# Patient Record
Sex: FEMALE | Race: BLACK OR AFRICAN AMERICAN | Employment: UNEMPLOYED | URBAN - METROPOLITAN AREA
[De-identification: names, ages, dates, MRNs, and addresses within clinical notes are randomized per-mention and may not be internally consistent; named-entity substitution may affect disease eponyms.]

---

## 2016-07-27 LAB — MICROALBUMIN UR TEST STR-MCNC: 100 MG/DL

## 2017-05-16 ENCOUNTER — ED HISTORICAL/CONVERTED ENCOUNTER (OUTPATIENT)
Dept: OTHER | Age: 52
End: 2017-05-16

## 2020-02-05 LAB
CREATININE, EXTERNAL: 3.02
LDL-C, EXTERNAL: 115

## 2020-02-18 ENCOUNTER — OP HISTORICAL/CONVERTED ENCOUNTER (OUTPATIENT)
Dept: OTHER | Age: 55
End: 2020-02-18

## 2020-02-18 LAB — MAMMOGRAPHY, EXTERNAL: NORMAL

## 2020-08-31 VITALS
HEART RATE: 82 BPM | OXYGEN SATURATION: 98 % | TEMPERATURE: 97.9 F | DIASTOLIC BLOOD PRESSURE: 94 MMHG | SYSTOLIC BLOOD PRESSURE: 183 MMHG | RESPIRATION RATE: 16 BRPM | WEIGHT: 175 LBS | BODY MASS INDEX: 29.88 KG/M2 | HEIGHT: 64 IN

## 2020-08-31 PROBLEM — N18.30 STAGE 3 CHRONIC KIDNEY DISEASE (HCC): Status: ACTIVE | Noted: 2020-08-31

## 2020-08-31 PROBLEM — I10 ESSENTIAL HYPERTENSION: Status: ACTIVE | Noted: 2020-08-31

## 2020-08-31 PROBLEM — R06.83 SNORING: Status: ACTIVE | Noted: 2020-08-31

## 2020-08-31 RX ORDER — LISINOPRIL 10 MG/1
TABLET ORAL DAILY
COMMUNITY
End: 2020-12-03 | Stop reason: ALTCHOICE

## 2020-08-31 RX ORDER — LOSARTAN POTASSIUM 50 MG/1
TABLET ORAL DAILY
COMMUNITY
End: 2020-12-03 | Stop reason: SDUPTHER

## 2020-10-02 ENCOUNTER — TELEPHONE (OUTPATIENT)
Dept: FAMILY MEDICINE CLINIC | Age: 55
End: 2020-10-02

## 2020-10-02 NOTE — TELEPHONE ENCOUNTER
Patient called and LVM stating she needs a refill on her Losartan 50 mg. Patient has [address] and keeps getting cancelled by new providers. She has ran out of her BP medication.      Patient advised to contact pharmacy and ask for them to send a refill request.

## 2020-11-02 RX ORDER — LOSARTAN POTASSIUM 50 MG/1
50 TABLET ORAL DAILY
Qty: 90 TAB | Refills: 1 | Status: SHIPPED | OUTPATIENT
Start: 2020-11-02 | End: 2020-12-03 | Stop reason: DRUGHIGH

## 2020-12-03 ENCOUNTER — OFFICE VISIT (OUTPATIENT)
Dept: INTERNAL MEDICINE CLINIC | Age: 55
End: 2020-12-03
Payer: COMMERCIAL

## 2020-12-03 VITALS
SYSTOLIC BLOOD PRESSURE: 180 MMHG | TEMPERATURE: 97.5 F | BODY MASS INDEX: 28.58 KG/M2 | DIASTOLIC BLOOD PRESSURE: 90 MMHG | OXYGEN SATURATION: 98 % | HEIGHT: 64 IN | WEIGHT: 167.4 LBS | HEART RATE: 77 BPM

## 2020-12-03 DIAGNOSIS — L40.0 PLAQUE PSORIASIS: ICD-10-CM

## 2020-12-03 DIAGNOSIS — Z23 NEEDS FLU SHOT: ICD-10-CM

## 2020-12-03 DIAGNOSIS — N18.5 CKD STAGE 5 SECONDARY TO HYPERTENSION (HCC): ICD-10-CM

## 2020-12-03 DIAGNOSIS — N18.4 CKD STAGE 4 SECONDARY TO HYPERTENSION (HCC): ICD-10-CM

## 2020-12-03 DIAGNOSIS — I10 ESSENTIAL HYPERTENSION: Primary | ICD-10-CM

## 2020-12-03 DIAGNOSIS — E78.2 MIXED HYPERLIPIDEMIA: ICD-10-CM

## 2020-12-03 DIAGNOSIS — E87.20 METABOLIC ACIDOSIS: ICD-10-CM

## 2020-12-03 DIAGNOSIS — R80.9 PROTEINURIA, UNSPECIFIED TYPE: ICD-10-CM

## 2020-12-03 DIAGNOSIS — E87.5 HYPERKALEMIA: ICD-10-CM

## 2020-12-03 DIAGNOSIS — I12.9 CKD STAGE 4 SECONDARY TO HYPERTENSION (HCC): ICD-10-CM

## 2020-12-03 DIAGNOSIS — I12.0 CKD STAGE 5 SECONDARY TO HYPERTENSION (HCC): ICD-10-CM

## 2020-12-03 PROCEDURE — 99203 OFFICE O/P NEW LOW 30 MIN: CPT | Performed by: INTERNAL MEDICINE

## 2020-12-03 PROCEDURE — 90756 CCIIV4 VACC ABX FREE IM: CPT | Performed by: INTERNAL MEDICINE

## 2020-12-03 RX ORDER — CLOBETASOL PROPIONATE 0.5 MG/G
OINTMENT TOPICAL 2 TIMES DAILY
Qty: 15 G | Refills: 0 | Status: SHIPPED | OUTPATIENT
Start: 2020-12-03 | End: 2021-04-06 | Stop reason: SDUPTHER

## 2020-12-03 RX ORDER — LOSARTAN POTASSIUM 100 MG/1
100 TABLET ORAL DAILY
Qty: 30 TAB | Refills: 2 | Status: SHIPPED | OUTPATIENT
Start: 2020-12-03 | End: 2021-01-06 | Stop reason: ALTCHOICE

## 2020-12-03 NOTE — PROGRESS NOTES
Muna Ann is a 50 y.o. female and presents with New Patient and Hypertension    HTN: Her bp today iselevated she says at home as well, lowest systolic 887   She has SANJU diagnosed, had cancelled the appointment to get fitted for cpap mask. She has daytime sleepiness, fatigue, frequent nighttime awakenings. Smoking: She wants to quit, she tried wellbutrin but she says it doesn't work. Pap a year ago wnl      Review of Systems  Review of Systems   Constitutional: Negative for chills, fatigue, fever and unexpected weight change. HENT: Negative for congestion, ear pain, sneezing and sore throat. Eyes: Negative for pain and discharge. Respiratory: Negative for cough, shortness of breath and wheezing. Cardiovascular: Negative for chest pain, palpitations and leg swelling. Gastrointestinal: Negative for abdominal pain, blood in stool, constipation and diarrhea. Endocrine: Negative for polydipsia and polyuria. Genitourinary: Negative for difficulty urinating, dysuria, frequency, hematuria and urgency. Musculoskeletal: Negative for arthralgias, back pain and joint swelling. Skin: Negative for rash. Allergic/Immunologic: Negative for environmental allergies and food allergies. Neurological: Negative for dizziness, speech difficulty, weakness, light-headedness, numbness and headaches. Hematological: Negative for adenopathy. Psychiatric/Behavioral: Negative for behavioral problems (Depression), sleep disturbance and suicidal ideas. Past Medical History:   Diagnosis Date    Chronic neck and back pain     Contact dermatitis and eczema due to cause     Dizziness     Hypertension     Sleep apnea      History reviewed. No pertinent surgical history.   Social History     Socioeconomic History    Marital status: SINGLE     Spouse name: Not on file    Number of children: Not on file    Years of education: Not on file    Highest education level: Not on file   Tobacco Use    Smoking status: Current Every Day Smoker     Packs/day: 1.00     Years: 33.00     Pack years: 33.00    Smokeless tobacco: Never Used   Substance and Sexual Activity    Alcohol use: Yes     Comment: occassional     Drug use: Never     Family History   Problem Relation Age of Onset    Hypertension Mother     Hypertension Sister     Heart Disease Sister     Cancer Sister         lung    Kidney Disease Sister      Current Outpatient Medications   Medication Sig Dispense Refill    losartan (COZAAR) 100 mg tablet Take 1 Tab by mouth daily for 90 days. Indications: high blood pressure 30 Tab 2    clobetasoL (TEMOVATE) 0.05 % ointment Apply  to affected area two (2) times a day. 15 g 0     No Known Allergies    Objective:  Visit Vitals  BP (!) 180/90 (BP 1 Location: Left arm, BP Patient Position: Sitting)   Pulse 77   Temp 97.5 °F (36.4 °C) (Oral)   Ht 5' 4\" (1.626 m)   Wt 167 lb 6.4 oz (75.9 kg)   SpO2 98% Comment: RA   BMI 28.73 kg/m²     Physical Exam:   Physical Exam  Constitutional:       General: She is not in acute distress. Appearance: Normal appearance. HENT:      Head: Normocephalic and atraumatic. Mouth/Throat:      Mouth: Mucous membranes are moist.   Eyes:      Extraocular Movements: Extraocular movements intact. Conjunctiva/sclera: Conjunctivae normal.      Pupils: Pupils are equal, round, and reactive to light. Neck:      Musculoskeletal: Normal range of motion and neck supple. Cardiovascular:      Rate and Rhythm: Normal rate and regular rhythm. Pulses: Normal pulses. Heart sounds: Normal heart sounds. Pulmonary:      Effort: Pulmonary effort is normal.      Breath sounds: Normal breath sounds. Abdominal:      General: Abdomen is flat. Bowel sounds are normal. There is no distension. Palpations: Abdomen is soft. There is no mass. Tenderness: There is no abdominal tenderness. Musculoskeletal:         General: No swelling or deformity.       Right lower leg: No edema. Left lower leg: No edema. Lymphadenopathy:      Cervical: No cervical adenopathy. Skin:     General: Skin is warm and dry. Capillary Refill: Capillary refill takes less than 2 seconds. Coloration: Skin is not jaundiced or pale. Findings: No erythema or rash (patchy oval well delimited raised and rough lesion hyperipgmented and with silver scalling on top of around 12 cms long and 4 cm wide in the anterior left shin mid third ). Neurological:      General: No focal deficit present. Mental Status: She is alert and oriented to person, place, and time. Psychiatric:         Mood and Affect: Mood normal.         Behavior: Behavior normal.         Thought Content: Thought content normal.         Judgment: Judgment normal.          Results for orders placed or performed in visit on 08/31/20    MAMMOGRAPHY   Result Value Ref Range    Mammography, External       IMPRESSION:  Possible 4 mm oval mass at 1:00 middle depth right breast seen only   AMB EXT LDL-C   Result Value Ref Range    LDL-C, External 115    AMB EXT CREATININE   Result Value Ref Range    Creatinine, External 3.02    AMB EXT URINE MICROALBUMIN   Result Value Ref Range    Urine Microalbumin, External 100        Assessment/Plan:    Hypertension is uncontrolled, increase losartan from 50 to 100 mg, educated on lifestyle modification. Lesion found in the physical exam the skin is compatible with plaque psoriasis, start clobetasol. History of CKD stage IV and proteinuria, check the following labs. Recheck lipid panel hyperlipidemia. ICD-10-CM ICD-9-CM    1. Essential hypertension  I10 401.9 losartan (COZAAR) 100 mg tablet   2. Plaque psoriasis  L40.0 696.1 clobetasoL (TEMOVATE) 0.05 % ointment   3. CKD stage 4 secondary to hypertension ([facility])  X91.3 653.14 METABOLIC PANEL, COMPREHENSIVE    N18.4 585.4    4.  Proteinuria, unspecified type  R80.9 791.0 MICROALBUMIN, UR, RAND W/ MICROALB/CREAT RATIO METABOLIC PANEL, COMPREHENSIVE   5. Mixed hyperlipidemia  E78.2 272.2 LIPID PANEL     Orders Placed This Encounter    MICROALBUMIN, UR, RAND W/ MICROALB/CREAT RATIO    METABOLIC PANEL, COMPREHENSIVE    LIPID PANEL    losartan (COZAAR) 100 mg tablet     Sig: Take 1 Tab by mouth daily for 90 days. Indications: high blood pressure     Dispense:  30 Tab     Refill:  2     Please note dose increase, please cancel the 50 mg refills she might have left    clobetasoL (TEMOVATE) 0.05 % ointment     Sig: Apply  to affected area two (2) times a day. Dispense:  15 g     Refill:  0     bring BP log to office visit, routine labs ordered, call if any problems  There are no Patient Instructions on file for this visit. Follow-up and Dispositions    · Return in about 1 month (around 1/3/2021).

## 2020-12-03 NOTE — PROGRESS NOTES
Chief Complaint   Patient presents with    New Patient    Hypertension     Pt states that she needs to establish a new PCP.  States that she snores and she was supposed to get a C-pap machine and did not get it because of Covid    Previous PCP- Em Ricketts NP

## 2020-12-04 LAB
ALBUMIN SERPL-MCNC: 4.1 G/DL (ref 3.8–4.9)
ALBUMIN/CREAT UR: 702 MG/G CREAT (ref 0–29)
ALBUMIN/GLOB SERPL: 1.4 {RATIO} (ref 1.2–2.2)
ALP SERPL-CCNC: 100 IU/L (ref 39–117)
ALT SERPL-CCNC: 4 IU/L (ref 0–32)
AST SERPL-CCNC: 11 IU/L (ref 0–40)
BILIRUB SERPL-MCNC: <0.2 MG/DL (ref 0–1.2)
BUN SERPL-MCNC: 50 MG/DL (ref 6–24)
BUN/CREAT SERPL: 13 (ref 9–23)
CALCIUM SERPL-MCNC: 8.8 MG/DL (ref 8.7–10.2)
CHLORIDE SERPL-SCNC: 114 MMOL/L (ref 96–106)
CHOLEST SERPL-MCNC: 228 MG/DL (ref 100–199)
CO2 SERPL-SCNC: 14 MMOL/L (ref 20–29)
CREAT SERPL-MCNC: 3.82 MG/DL (ref 0.57–1)
CREAT UR-MCNC: 70.3 MG/DL
GLOBULIN SER CALC-MCNC: 2.9 G/DL (ref 1.5–4.5)
GLUCOSE SERPL-MCNC: 83 MG/DL (ref 65–99)
HDLC SERPL-MCNC: 73 MG/DL
LDLC SERPL CALC-MCNC: 134 MG/DL (ref 0–99)
MICROALBUMIN UR-MCNC: 493.5 UG/ML
POTASSIUM SERPL-SCNC: 6.1 MMOL/L (ref 3.5–5.2)
PROT SERPL-MCNC: 7 G/DL (ref 6–8.5)
SODIUM SERPL-SCNC: 142 MMOL/L (ref 134–144)
TRIGL SERPL-MCNC: 121 MG/DL (ref 0–149)
VLDLC SERPL CALC-MCNC: 21 MG/DL (ref 5–40)

## 2020-12-09 RX ORDER — SODIUM BICARBONATE 650 MG/1
650 TABLET ORAL 2 TIMES DAILY
Qty: 60 TAB | Refills: 1 | Status: SHIPPED | OUTPATIENT
Start: 2020-12-09 | End: 2021-02-07

## 2020-12-09 NOTE — PROGRESS NOTES
Les I spoke to her, I am referring her to Nephrology Dr. Saundra Nugent, can you see if you ca get her an appointment the soonest possible? And can you please mail her the referral? Thanks    GFR 13, metabolic acidosis hyperkalemia 6.1. Start bicarb, I spoke to her explained her it's extremely important she sees nephrology soon, I discussed with her probable need for dialysis soon.  Referred to Dr. Saundra Nugent

## 2021-01-06 ENCOUNTER — OFFICE VISIT (OUTPATIENT)
Dept: INTERNAL MEDICINE CLINIC | Age: 56
End: 2021-01-06
Payer: COMMERCIAL

## 2021-01-06 VITALS
HEIGHT: 64 IN | BODY MASS INDEX: 27.59 KG/M2 | TEMPERATURE: 98.4 F | WEIGHT: 161.6 LBS | DIASTOLIC BLOOD PRESSURE: 92 MMHG | HEART RATE: 86 BPM | SYSTOLIC BLOOD PRESSURE: 163 MMHG | OXYGEN SATURATION: 100 %

## 2021-01-06 DIAGNOSIS — I10 ESSENTIAL HYPERTENSION: ICD-10-CM

## 2021-01-06 DIAGNOSIS — I12.0 CKD STAGE 5 SECONDARY TO HYPERTENSION (HCC): ICD-10-CM

## 2021-01-06 DIAGNOSIS — N18.5 CKD STAGE 5 SECONDARY TO HYPERTENSION (HCC): ICD-10-CM

## 2021-01-06 DIAGNOSIS — G47.33 OSA (OBSTRUCTIVE SLEEP APNEA): Primary | ICD-10-CM

## 2021-01-06 DIAGNOSIS — R80.9 PROTEINURIA, UNSPECIFIED TYPE: ICD-10-CM

## 2021-01-06 PROCEDURE — 99213 OFFICE O/P EST LOW 20 MIN: CPT | Performed by: INTERNAL MEDICINE

## 2021-01-06 RX ORDER — TORSEMIDE 20 MG/1
TABLET ORAL
COMMUNITY
Start: 2020-12-11

## 2021-01-06 RX ORDER — ERGOCALCIFEROL 1.25 MG/1
CAPSULE ORAL
COMMUNITY
Start: 2020-12-21

## 2021-01-06 RX ORDER — AMLODIPINE BESYLATE 10 MG/1
5 TABLET ORAL
COMMUNITY
Start: 2020-12-11

## 2021-01-06 NOTE — PROGRESS NOTES
Doyle Holden is a 50 y.o. female and presents with Follow-up, Hypertension, Chronic Kidney Disease, and Snoring      HTN and CKD stage 5: She has seen Dr. Cody Galeas twice, last visit 2 weeks ago, following low potassium and low protein diet and she says she feels good following it, no difficulties. She has appt again on Jan 19th. She mentioned she was started on a 50 mg pill to take 3 times a day which made her very dizzy, she stopped it and told Dr. Cody Galeas about this. I pressume it was hydralazine, but I don't have it in the med list. BP is not at goal yet but it's much better than before, she's feeling well, denies sob, LE swelling, palpitations, CP. Taking all of her other medications as prescribed    SANJU: has not yet rescheduled appointment for cpap titration, she does not remember who saw missy but she says it was in [place]    Smoking: She has reduced to 4 to 5 cigs per day from 1 pack a day, not ready yet to quit completely. Has failed wellbutrin in the past       Review of Systems  Review of Systems   Constitutional: Negative for chills, fatigue, fever and unexpected weight change. HENT: Negative for congestion, ear pain, sneezing and sore throat. Eyes: Negative for pain and discharge. Respiratory: Negative for cough, shortness of breath and wheezing. Cardiovascular: Negative for chest pain, palpitations and leg swelling. Gastrointestinal: Negative for abdominal pain, blood in stool, constipation and diarrhea. Endocrine: Negative for polydipsia and polyuria. Genitourinary: Negative for difficulty urinating, dysuria, frequency, hematuria and urgency. Musculoskeletal: Negative for arthralgias, back pain and joint swelling. Skin: Negative for rash. Allergic/Immunologic: Negative for environmental allergies and food allergies. Neurological: Negative for dizziness, speech difficulty, weakness, light-headedness, numbness and headaches. Hematological: Negative for adenopathy. Psychiatric/Behavioral: Negative for behavioral problems (Depression), sleep disturbance and suicidal ideas. Past Medical History:   Diagnosis Date    Chronic neck and back pain     Contact dermatitis and eczema due to cause     Dizziness     Hypertension     Sleep apnea      History reviewed. No pertinent surgical history. Social History     Socioeconomic History    Marital status: SINGLE     Spouse name: Not on file    Number of children: Not on file    Years of education: Not on file    Highest education level: Not on file   Tobacco Use    Smoking status: Current Every Day Smoker     Packs/day: 1.00     Years: 33.00     Pack years: 33.00    Smokeless tobacco: Never Used   Substance and Sexual Activity    Alcohol use: Not Currently     Comment: occassional     Drug use: Never     Family History   Problem Relation Age of Onset    Hypertension Mother     Hypertension Sister     Heart Disease Sister     Cancer Sister         lung    Kidney Disease Sister      Current Outpatient Medications   Medication Sig Dispense Refill    amLODIPine (NORVASC) 10 mg tablet TAKE 1 TABLET BY MOUTH EVERY DAY      ergocalciferol (ERGOCALCIFEROL) 1,250 mcg (50,000 unit) capsule TAKE 1 CAPSULE BY MOUTH 1 TIME A WEEK      torsemide (DEMADEX) 20 mg tablet TAKE 1 TABLET BY MOUTH EVERY DAY      sodium bicarbonate 650 mg tablet Take 1 Tab by mouth two (2) times a day for 60 days. Indications: excess body acid 60 Tab 1    clobetasoL (TEMOVATE) 0.05 % ointment Apply  to affected area two (2) times a day. 15 g 0     No Known Allergies    Objective:  Visit Vitals  BP (!) 163/92 (BP 1 Location: Right arm, BP Patient Position: Sitting)   Pulse 86   Temp 98.4 °F (36.9 °C) (Oral)   Ht 5' 4\" (1.626 m)   Wt 161 lb 9.6 oz (73.3 kg)   SpO2 100% Comment: RA   BMI 27.74 kg/m²     Physical Exam:   Physical Exam  Constitutional:       General: She is not in acute distress. Appearance: Normal appearance.    HENT: Head: Normocephalic and atraumatic. Mouth/Throat:      Mouth: Mucous membranes are moist.   Eyes:      Extraocular Movements: Extraocular movements intact. Conjunctiva/sclera: Conjunctivae normal.      Pupils: Pupils are equal, round, and reactive to light. Neck:      Musculoskeletal: Normal range of motion and neck supple. Cardiovascular:      Rate and Rhythm: Normal rate and regular rhythm. Pulses: Normal pulses. Heart sounds: Normal heart sounds. Pulmonary:      Effort: Pulmonary effort is normal.      Breath sounds: Normal breath sounds. Abdominal:      General: Abdomen is flat. Bowel sounds are normal. There is no distension. Palpations: Abdomen is soft. There is no mass. Tenderness: There is no abdominal tenderness. Musculoskeletal:         General: No swelling or deformity. Right lower leg: No edema. Left lower leg: No edema. Lymphadenopathy:      Cervical: No cervical adenopathy. Skin:     General: Skin is warm and dry. Capillary Refill: Capillary refill takes less than 2 seconds. Coloration: Skin is not jaundiced or pale. Findings: No erythema or rash. Neurological:      General: No focal deficit present. Mental Status: She is alert and oriented to person, place, and time. Psychiatric:         Mood and Affect: Mood normal.         Behavior: Behavior normal.         Thought Content:  Thought content normal.         Judgment: Judgment normal.          Results for orders placed or performed in visit on 12/03/20   MICROALBUMIN, UR, RAND W/ MICROALB/CREAT RATIO   Result Value Ref Range    Creatinine, urine 70.3 Not Estab. mg/dL    Microalbumin, urine 493.5 Not Estab. ug/mL    Microalb/Creat ratio (ug/mg creat.) 702 (H) 0 - 29 mg/g creat   METABOLIC PANEL, COMPREHENSIVE   Result Value Ref Range    Glucose 83 65 - 99 mg/dL    BUN 50 (H) 6 - 24 mg/dL    Creatinine 3.82 (H) 0.57 - 1.00 mg/dL    GFR est non-AA 13 (L) >59 mL/min/1.73    GFR est AA 15 (L) >59 mL/min/1.73    BUN/Creatinine ratio 13 9 - 23    Sodium 142 134 - 144 mmol/L    Potassium 6.1 (H) 3.5 - 5.2 mmol/L    Chloride 114 (H) 96 - 106 mmol/L    CO2 14 (L) 20 - 29 mmol/L    Calcium 8.8 8.7 - 10.2 mg/dL    Protein, total 7.0 6.0 - 8.5 g/dL    Albumin 4.1 3.8 - 4.9 g/dL    GLOBULIN, TOTAL 2.9 1.5 - 4.5 g/dL    A-G Ratio 1.4 1.2 - 2.2    Bilirubin, total <0.2 0.0 - 1.2 mg/dL    Alk. phosphatase 100 39 - 117 IU/L    AST (SGOT) 11 0 - 40 IU/L    ALT (SGPT) 4 0 - 32 IU/L   LIPID PANEL   Result Value Ref Range    Cholesterol, total 228 (H) 100 - 199 mg/dL    Triglyceride 121 0 - 149 mg/dL    HDL Cholesterol 73 >39 mg/dL    VLDL, calculated 21 5 - 40 mg/dL    LDL, calculated 134 (H) 0 - 99 mg/dL       Assessment/Plan:    She is following actively with nephrology now, blood pressure is not at goal but she has had recent modifications, so will not make any other changes for now she has appointment coming up in 2 weeks with nephrology. It is important to revisit the sleep apnea treatment to control her blood pressure better, referral for the follow-up, she is not really sure who saw her, she mentioned it was at [facility] so referring her to Dr. Eliseo Perry    1. SANJU (obstructive sleep apnea)  G47.33 327.23 SLEEP MEDICINE REFERRAL   2. CKD stage 5 secondary to hypertension ([facility])  I12.0 403.91     N18.5 585.5    3.  Proteinuria, unspecified type  R80.9 791.0    4. Essential hypertension  I10 401.9      Orders Placed This Encounter    SLEEP MEDICINE REFERRAL     Referral Priority:   Routine     Referral Type:   Consultation     Referral Reason:   Specialty Services Required     Referred to Provider:   Yanick Menon MD     Number of Visits Requested:   1    amLODIPine (NORVASC) 10 mg tablet     Sig: TAKE 1 TABLET BY MOUTH EVERY DAY    ergocalciferol (ERGOCALCIFEROL) 1,250 mcg (50,000 unit) capsule     Sig: TAKE 1 CAPSULE BY MOUTH 1 TIME A WEEK    torsemide (DEMADEX) 20 mg tablet Sig: TAKE 1 TABLET BY MOUTH EVERY DAY     stop smoking (advice and handout given), bring BP log to office visit, follow low fat diet, follow low salt diet, call if any problems  There are no Patient Instructions on file for this visit. Follow-up and Dispositions    · Return in about 3 months (around 4/6/2021).

## 2021-04-06 ENCOUNTER — OFFICE VISIT (OUTPATIENT)
Dept: INTERNAL MEDICINE CLINIC | Age: 56
End: 2021-04-06
Payer: COMMERCIAL

## 2021-04-06 VITALS
TEMPERATURE: 97.8 F | HEIGHT: 64 IN | HEART RATE: 84 BPM | OXYGEN SATURATION: 100 % | BODY MASS INDEX: 26.87 KG/M2 | DIASTOLIC BLOOD PRESSURE: 86 MMHG | RESPIRATION RATE: 18 BRPM | WEIGHT: 157.4 LBS | SYSTOLIC BLOOD PRESSURE: 165 MMHG

## 2021-04-06 DIAGNOSIS — E87.20 METABOLIC ACIDOSIS: ICD-10-CM

## 2021-04-06 DIAGNOSIS — I12.0 CKD STAGE 5 SECONDARY TO HYPERTENSION (HCC): Primary | ICD-10-CM

## 2021-04-06 DIAGNOSIS — G47.33 OSA (OBSTRUCTIVE SLEEP APNEA): ICD-10-CM

## 2021-04-06 DIAGNOSIS — E78.2 MIXED HYPERLIPIDEMIA: ICD-10-CM

## 2021-04-06 DIAGNOSIS — L40.0 PLAQUE PSORIASIS: ICD-10-CM

## 2021-04-06 DIAGNOSIS — N18.5 CKD STAGE 5 SECONDARY TO HYPERTENSION (HCC): Primary | ICD-10-CM

## 2021-04-06 DIAGNOSIS — I10 ESSENTIAL HYPERTENSION: ICD-10-CM

## 2021-04-06 PROCEDURE — 99214 OFFICE O/P EST MOD 30 MIN: CPT | Performed by: INTERNAL MEDICINE

## 2021-04-06 RX ORDER — HYDRALAZINE HYDROCHLORIDE 50 MG/1
50 TABLET, FILM COATED ORAL 3 TIMES DAILY
COMMUNITY

## 2021-04-06 RX ORDER — SODIUM BICARBONATE 650 MG/1
1200 TABLET ORAL 3 TIMES DAILY
COMMUNITY

## 2021-04-06 RX ORDER — CLOBETASOL PROPIONATE 0.5 MG/G
OINTMENT TOPICAL 2 TIMES DAILY
Qty: 60 G | Refills: 3 | Status: SHIPPED | OUTPATIENT
Start: 2021-04-06

## 2021-04-06 NOTE — PROGRESS NOTES
1. Have you been to the ER, urgent care clinic since your last visit? Hospitalized since your last visit? No    2. Have you seen or consulted any other health care providers outside of the [address] [place] [address] since your last visit? Include any pap smears or colon screening. No     Chief Complaint   Patient presents with    Follow-up    Hypertension    Chronic Kidney Disease    Sleep Problem     Pt states that she is here for a f/u visit.

## 2021-04-06 NOTE — PROGRESS NOTES
Cailin Panda is a 50 y.o. female and presents with Follow-up, Hypertension, Chronic Kidney Disease, and Sleep Problem    HTN and CKD stage 5: She didn't bring her medication bottles again, I do not have her full medications. Her blood pressure is elevated today, will call dr. Kathia Hudson. SANJU: She did not make appointment with sleep medicine      She needs clobetasol for eczema         Review of Systems  Review of Systems   Constitutional: Negative for chills, fatigue, fever and unexpected weight change. HENT: Negative for congestion, ear pain, sneezing and sore throat. Eyes: Negative for pain and discharge. Respiratory: Negative for cough, shortness of breath and wheezing. Cardiovascular: Negative for chest pain, palpitations and leg swelling. Gastrointestinal: Negative for abdominal pain, blood in stool, constipation and diarrhea. Endocrine: Negative for polydipsia and polyuria. Genitourinary: Negative for difficulty urinating, dysuria, frequency, hematuria and urgency. Musculoskeletal: Negative for arthralgias, back pain and joint swelling. Skin: Negative for rash. Allergic/Immunologic: Negative for environmental allergies and food allergies. Neurological: Negative for dizziness, speech difficulty, weakness, light-headedness, numbness and headaches. Hematological: Negative for adenopathy. Psychiatric/Behavioral: Negative for behavioral problems (Depression), sleep disturbance and suicidal ideas. Past Medical History:   Diagnosis Date    Chronic neck and back pain     Contact dermatitis and eczema due to cause     Dizziness     Hypertension     Sleep apnea      History reviewed. No pertinent surgical history.   Social History     Socioeconomic History    Marital status: SINGLE     Spouse name: Not on file    Number of children: Not on file    Years of education: Not on file    Highest education level: Not on file   Tobacco Use    Smoking status: Current Every Day Smoker     Packs/day: 1.00     Years: 33.00     Pack years: 33.00     Types: Cigarettes    Smokeless tobacco: Never Used   Substance and Sexual Activity    Alcohol use: Not Currently     Frequency: Never     Binge frequency: Never     Comment: occassional     Drug use: Never     Family History   Problem Relation Age of Onset    Hypertension Mother     Hypertension Sister     Heart Disease Sister     Cancer Sister         lung    Kidney Disease Sister      Current Outpatient Medications   Medication Sig Dispense Refill    clobetasoL (TEMOVATE) 0.05 % ointment Apply  to affected area two (2) times a day. 60 g 3    sodium bicarbonate 650 mg tablet Take 1,200 mg by mouth three (3) times daily.  hydrALAZINE (APRESOLINE) 50 mg tablet Take 50 mg by mouth three (3) times daily.  amLODIPine (NORVASC) 10 mg tablet TAKE 1 TABLET BY MOUTH EVERY DAY      ergocalciferol (ERGOCALCIFEROL) 1,250 mcg (50,000 unit) capsule TAKE 1 CAPSULE BY MOUTH 1 TIME A WEEK      torsemide (DEMADEX) 20 mg tablet TAKE 1 TABLET BY MOUTH EVERY DAY       No Known Allergies    Objective:  Visit Vitals  BP (!) 165/86 (BP 1 Location: Left upper arm, BP Patient Position: Sitting, BP Cuff Size: Adult)   Pulse 84   Temp 97.8 °F (36.6 °C) (Oral)   Resp 18   Ht 5' 4\" (1.626 m)   Wt 157 lb 6.4 oz (71.4 kg)   SpO2 100% Comment: RA   BMI 27.02 kg/m²     Physical Exam:   Physical Exam  Constitutional:       General: She is not in acute distress. Appearance: Normal appearance. HENT:      Head: Normocephalic and atraumatic. Mouth/Throat:      Mouth: Mucous membranes are moist.   Eyes:      Extraocular Movements: Extraocular movements intact. Conjunctiva/sclera: Conjunctivae normal.      Pupils: Pupils are equal, round, and reactive to light. Neck:      Musculoskeletal: Normal range of motion and neck supple. Cardiovascular:      Rate and Rhythm: Normal rate and regular rhythm. Pulses: Normal pulses.       Heart sounds: Normal heart sounds. Pulmonary:      Effort: Pulmonary effort is normal.      Breath sounds: Normal breath sounds. Abdominal:      General: Abdomen is flat. Bowel sounds are normal. There is no distension. Palpations: Abdomen is soft. There is no mass. Tenderness: There is no abdominal tenderness. Musculoskeletal:         General: No swelling or deformity. Right lower leg: No edema. Left lower leg: No edema. Lymphadenopathy:      Cervical: No cervical adenopathy. Skin:     General: Skin is warm and dry. Capillary Refill: Capillary refill takes less than 2 seconds. Coloration: Skin is not jaundiced or pale. Findings: Lesion (Erythematous hyperpigmented and scaly raised irregular lesion in the left shin off around 10 cm ) present. No erythema or rash. Neurological:      General: No focal deficit present. Mental Status: She is alert and oriented to person, place, and time. Psychiatric:         Mood and Affect: Mood normal.         Behavior: Behavior normal.         Thought Content:  Thought content normal.         Judgment: Judgment normal.          Results for orders placed or performed in visit on 12/03/20   MICROALBUMIN, UR, RAND W/ MICROALB/CREAT RATIO   Result Value Ref Range    Creatinine, urine 70.3 Not Estab. mg/dL    Microalbumin, urine 493.5 Not Estab. ug/mL    Microalb/Creat ratio (ug/mg creat.) 702 (H) 0 - 29 mg/g creat   METABOLIC PANEL, COMPREHENSIVE   Result Value Ref Range    Glucose 83 65 - 99 mg/dL    BUN 50 (H) 6 - 24 mg/dL    Creatinine 3.82 (H) 0.57 - 1.00 mg/dL    GFR est non-AA 13 (L) >59 mL/min/1.73    GFR est AA 15 (L) >59 mL/min/1.73    BUN/Creatinine ratio 13 9 - 23    Sodium 142 134 - 144 mmol/L    Potassium 6.1 (H) 3.5 - 5.2 mmol/L    Chloride 114 (H) 96 - 106 mmol/L    CO2 14 (L) 20 - 29 mmol/L    Calcium 8.8 8.7 - 10.2 mg/dL    Protein, total 7.0 6.0 - 8.5 g/dL    Albumin 4.1 3.8 - 4.9 g/dL    GLOBULIN, TOTAL 2.9 1.5 - 4.5 g/dL    A-G Ratio 1.4 1.2 - 2.2    Bilirubin, total <0.2 0.0 - 1.2 mg/dL    Alk. phosphatase 100 39 - 117 IU/L    AST (SGOT) 11 0 - 40 IU/L    ALT (SGPT) 4 0 - 32 IU/L   LIPID PANEL   Result Value Ref Range    Cholesterol, total 228 (H) 100 - 199 mg/dL    Triglyceride 121 0 - 149 mg/dL    HDL Cholesterol 73 >39 mg/dL    VLDL, calculated 21 5 - 40 mg/dL    LDL, calculated 134 (H) 0 - 99 mg/dL       Assessment/Plan:    Hypertension is not at goal today, I spoke to Dr. Jayla Pérez over the phone, she was here, confirmed with him that she is on Amlodipine, hydralazine, bicarbonate 1200 TID, he recommended if adjustment of blood pressure is needed to add nitrate if necessary, but he told me that he had her blood pressure was much higher last week that he saw her, so for now we will watch and ask her to check her blood pressure daily and bring the diary to next visit. I will also reach out to her in a couple of weeks to see how the blood pressure is behaving and then decide if we need to add anything else. He will start on calcitriol as well, she is already taking vitamin D. He told me the the plan is to start her on home peritoneal dialysis. Recommend her to make appointment with sleep medicine, she has known sleep apnea and has not done the CPAP titration study, explained that these has a big role in her blood pressure control. I have given her referral in our previous visit in January and she has it. ICD-10-CM ICD-9-CM    1. CKD stage 5 secondary to hypertension (HCC)  I12.0 403.91     N18.5 585.5    2. Plaque psoriasis  L40.0 696.1 clobetasoL (TEMOVATE) 0.05 % ointment   3. Essential hypertension  I10 401.9    4. SANJU (obstructive sleep apnea)  G47.33 327.23    5. Metabolic acidosis  H43.0 308.8    6. Mixed hyperlipidemia  E78.2 272.2      Orders Placed This Encounter    clobetasoL (TEMOVATE) 0.05 % ointment     Sig: Apply  to affected area two (2) times a day.      Dispense:  60 g     Refill:  3    sodium bicarbonate 650 mg tablet     Sig: Take 1,200 mg by mouth three (3) times daily.  hydrALAZINE (APRESOLINE) 50 mg tablet     Sig: Take 50 mg by mouth three (3) times daily. follow low salt diet, call if any problems, follow low K diet, bring bp diary to next visit     Patient Instructions   Remember to make appointment with Sleep medicine  Please bring your medication bottles to all visits. Please check your blood pressure 3 days a week and keep diary      Follow-up and Dispositions    · Return in about 3 months (around 7/6/2021).

## 2021-04-06 NOTE — PATIENT INSTRUCTIONS
Remember to make appointment with Sleep medicine Please bring your medication bottles to all visits. Please check your blood pressure 3 days a week and keep diary

## 2022-03-18 PROBLEM — N18.30 STAGE 3 CHRONIC KIDNEY DISEASE (HCC): Status: ACTIVE | Noted: 2020-08-31

## 2022-03-18 PROBLEM — I10 ESSENTIAL HYPERTENSION: Status: ACTIVE | Noted: 2020-08-31

## 2022-03-19 PROBLEM — R06.83 SNORING: Status: ACTIVE | Noted: 2020-08-31

## 2022-04-27 ENCOUNTER — TELEPHONE (OUTPATIENT)
Dept: INTERNAL MEDICINE CLINIC | Age: 57
End: 2022-04-27

## 2022-04-27 NOTE — TELEPHONE ENCOUNTER
----- Message from [employer] sent at 4/27/2022  9:19 AM EDT -----  Subject: Appointment Request    Reason for Call: Routine Physical Exam    QUESTIONS  Type of Appointment? Established Patient  Reason for appointment request? No appointments available during search  Additional Information for Provider? Patient would like to get scheduled   in for her annual physical, in this office, if possible. She is requesting   a call back. ---------------------------------------------------------------------------  --------------  Roby SPANN  What is the best way for the office to contact you? OK to leave message on   voicemail  Preferred Call Back Phone Number? [phone]  ---------------------------------------------------------------------------  --------------  SCRIPT ANSWERS  Relationship to Patient? Self  (If the patient has Medicare as their primary insurance coverage ask this   question) Are you requesting a Medicare Annual Wellness Visit? No  (Is the patient requesting a pap smear with their physical exam?)? No  (Is the patient requesting their annual physical and does not need PAP or   AWV per above?)? Yes   Have you been diagnosed with, awaiting test results for, or told that you   are suspected of having COVID-19 (Coronavirus)? (If patient has tested   negative or was tested as a requirement for work, school, or travel and   not based on symptoms, answer no)? No  Within the past 10 days have you developed any of the following symptoms   (answer no if symptoms have been present longer than 10 days or began   more than 10 days ago)? Fever or Chills, Cough, Shortness of breath or   difficulty breathing, Loss of taste or smell, Sore throat, Nasal   congestion, Sneezing or runny nose, Fatigue or generalized body aches   (answer no if pain is specific to a body part e.g. back pain), Diarrhea,   Headache? No  Have you had close contact with someone with COVID-19 in the last 7 days?    No  (Service Expert  click yes below to proceed with [place] As Usual   Scheduling)?  Yes

## 2023-05-25 RX ORDER — SODIUM BICARBONATE 650 MG/1
1200 TABLET ORAL 3 TIMES DAILY
COMMUNITY

## 2023-05-25 RX ORDER — HYDRALAZINE HYDROCHLORIDE 50 MG/1
50 TABLET, FILM COATED ORAL 3 TIMES DAILY
COMMUNITY

## 2023-05-25 RX ORDER — CLOBETASOL PROPIONATE 0.5 MG/G
OINTMENT TOPICAL 2 TIMES DAILY
COMMUNITY
Start: 2021-04-06

## 2023-05-25 RX ORDER — AMLODIPINE BESYLATE 10 MG/1
5 TABLET ORAL
COMMUNITY
Start: 2020-12-11

## 2023-05-25 RX ORDER — ERGOCALCIFEROL 1.25 MG/1
CAPSULE ORAL
COMMUNITY
Start: 2020-12-21

## 2023-05-25 RX ORDER — TORSEMIDE 20 MG/1
1 TABLET ORAL DAILY
COMMUNITY
Start: 2020-12-11

## 2024-10-24 ENCOUNTER — HOSPITAL ENCOUNTER (EMERGENCY)
Facility: HOSPITAL | Age: 59
Discharge: HOME | End: 2024-10-24
Attending: EMERGENCY MEDICINE
Payer: COMMERCIAL

## 2024-10-24 VITALS
DIASTOLIC BLOOD PRESSURE: 80 MMHG | SYSTOLIC BLOOD PRESSURE: 166 MMHG | OXYGEN SATURATION: 100 % | TEMPERATURE: 98.4 F | RESPIRATION RATE: 16 BRPM | HEIGHT: 65 IN | HEART RATE: 91 BPM | BODY MASS INDEX: 21.66 KG/M2 | WEIGHT: 130 LBS

## 2024-10-24 DIAGNOSIS — N18.4 ANEMIA IN STAGE 4 CHRONIC KIDNEY DISEASE (HCC): Primary | ICD-10-CM

## 2024-10-24 DIAGNOSIS — D63.1 ANEMIA IN STAGE 4 CHRONIC KIDNEY DISEASE (HCC): Primary | ICD-10-CM

## 2024-10-24 LAB
ANION GAP SERPL CALC-SCNC: 7 MMOL/L (ref 2–12)
BUN SERPL-MCNC: 112 MG/DL (ref 6–20)
BUN/CREAT SERPL: 8 (ref 12–20)
CA-I BLD-MCNC: 7 MG/DL (ref 8.5–10.1)
CHLORIDE SERPL-SCNC: 103 MMOL/L (ref 97–108)
CO2 SERPL-SCNC: 26 MMOL/L (ref 21–32)
CREAT SERPL-MCNC: 13.3 MG/DL (ref 0.55–1.02)
ERYTHROCYTE [DISTWIDTH] IN BLOOD BY AUTOMATED COUNT: 14 % (ref 11.5–14.5)
GLUCOSE SERPL-MCNC: 99 MG/DL (ref 65–100)
HCT VFR BLD AUTO: 20 % (ref 35–47)
HGB BLD-MCNC: 6.5 G/DL (ref 11.5–16)
MCH RBC QN AUTO: 29.4 PG (ref 26–34)
MCHC RBC AUTO-ENTMCNC: 32.5 G/DL (ref 30–36.5)
MCV RBC AUTO: 90.5 FL (ref 80–99)
NRBC # BLD: 0 K/UL (ref 0–0.01)
NRBC BLD-RTO: 0 PER 100 WBC
PLATELET # BLD AUTO: 207 K/UL (ref 150–400)
PMV BLD AUTO: 9.8 FL (ref 8.9–12.9)
POTASSIUM SERPL-SCNC: 5.6 MMOL/L (ref 3.5–5.1)
RBC # BLD AUTO: 2.21 M/UL (ref 3.8–5.2)
SODIUM SERPL-SCNC: 136 MMOL/L (ref 136–145)
WBC # BLD AUTO: 6.9 K/UL (ref 3.6–11)

## 2024-10-24 PROCEDURE — P9016 RBC LEUKOCYTES REDUCED: HCPCS

## 2024-10-24 PROCEDURE — 80048 BASIC METABOLIC PNL TOTAL CA: CPT

## 2024-10-24 PROCEDURE — 86901 BLOOD TYPING SEROLOGIC RH(D): CPT

## 2024-10-24 PROCEDURE — 86923 COMPATIBILITY TEST ELECTRIC: CPT

## 2024-10-24 PROCEDURE — 36415 COLL VENOUS BLD VENIPUNCTURE: CPT

## 2024-10-24 PROCEDURE — 99285 EMERGENCY DEPT VISIT HI MDM: CPT

## 2024-10-24 PROCEDURE — 86900 BLOOD TYPING SEROLOGIC ABO: CPT

## 2024-10-24 PROCEDURE — 86850 RBC ANTIBODY SCREEN: CPT

## 2024-10-24 PROCEDURE — 36430 TRANSFUSION BLD/BLD COMPNT: CPT

## 2024-10-24 PROCEDURE — 85027 COMPLETE CBC AUTOMATED: CPT

## 2024-10-24 RX ORDER — SODIUM CHLORIDE 9 MG/ML
INJECTION, SOLUTION INTRAVENOUS PRN
Status: DISCONTINUED | OUTPATIENT
Start: 2024-10-24 | End: 2024-10-24 | Stop reason: HOSPADM

## 2024-10-24 ASSESSMENT — LIFESTYLE VARIABLES
HOW MANY STANDARD DRINKS CONTAINING ALCOHOL DO YOU HAVE ON A TYPICAL DAY: PATIENT DOES NOT DRINK
HOW OFTEN DO YOU HAVE A DRINK CONTAINING ALCOHOL: NEVER

## 2024-10-24 ASSESSMENT — PAIN - FUNCTIONAL ASSESSMENT
PAIN_FUNCTIONAL_ASSESSMENT: 0-10
PAIN_FUNCTIONAL_ASSESSMENT: 0-10

## 2024-10-24 ASSESSMENT — PAIN SCALES - GENERAL
PAINLEVEL_OUTOF10: 0
PAINLEVEL_OUTOF10: 0

## 2024-10-24 NOTE — ED PROVIDER NOTES
[facility] EMERGENCY DEPT  EMERGENCY DEPARTMENT HISTORY AND PHYSICAL EXAM      Date: 10/24/2024  Patient Name: Brigitte Castro  MRN: [id number]  Birthdate 1965  Date of evaluation: 10/24/2024  Provider: Nyasia Farmer MD   Note Started: 1:48 PM EDT 10/24/24    HISTORY OF PRESENT ILLNESS     Chief Complaint   Patient presents with    Fatigue       History Provided By: Patient    HPI: Brigitte Castro is a 58 y.o. female with a history of end-stage renal disease on dialysis presenting for blood transfusion.  Patient states that she follows with Dr. Hollingsworth and was called by dialysis center today to come to the ER to get a unit of blood as her hemoglobin was less than 7.  States he been feeling some fatigue but no other symptoms    PAST MEDICAL HISTORY   Past Medical History:  Past Medical History:   Diagnosis Date    Chronic kidney disease     dialysis M,W,F    Contact dermatitis and eczema due to cause     Dizziness     Hypertension     Sleep apnea     cpap       Past Surgical History:  Past Surgical History:   Procedure Laterality Date    IR PORT PLACEMENT EQUAL OR GREATER THAN 5 YEARS         Family History:  Family History   Problem Relation Age of Onset    Kidney Disease Sister     Cancer Sister         lung    Heart Disease Sister     Hypertension Sister     Hypertension Mother        Social History:  Social History     Tobacco Use    Smoking status: Every Day     Current packs/day: 0.25     Types: Cigarettes    Smokeless tobacco: Never   Substance Use Topics    Alcohol use: Not Currently    Drug use: Never       Allergies:  Not on File    PCP: Rosalie Alejandro MD    Current Meds:   Current Facility-Administered Medications   Medication Dose Route Frequency Provider Last Rate Last Admin    0.9 % sodium chloride infusion   IntraVENous PRN Nyasia Farmer MD         Current Outpatient Medications   Medication Sig Dispense Refill    amLODIPine (NORVASC) 10 MG tablet 0.5 tablets      clobetasol (TEMOVATE) 0.05

## 2024-10-24 NOTE — CONSENT
Informed Consent for Blood Component Transfusion Note    I have discussed with the patient the rationale for blood component transfusion; its benefits in treating or preventing fatigue, organ damage, or death; and its risk which includes mild transfusion reactions, rare risk of blood borne infection, or more serious but rare reactions. I have discussed the alternatives to transfusion, including the risk and consequences of not receiving transfusion. The patient had an opportunity to ask questions and had agreed to proceed with transfusion of blood components.    Electronically signed by Nyasia Farmer MD on 10/24/24 at 2:53 PM EDT

## 2024-10-24 NOTE — ED TRIAGE NOTES
G[facility] 15 pt stated that her Nephrologist told her to come in for a transfusion d/t her Hgb level being low; c/o fatigue; denies any blood in her stool

## 2024-10-24 NOTE — DISCHARGE INSTRUCTIONS
Thank you for choosing our Emergency Department for your care.  It is our privilege to care for you in your time of need.  In the next several days, you may receive a survey via email or mailed to your home about your experience with our team.  We would greatly appreciate you taking a few minutes to complete the survey, as we use this information to learn what we have done well and what we could be doing better. Thank you for trusting us with your care!    Below you will find a list of your tests from today's visit.   Labs  Recent Results (from the past 12 hour(s))   CBC    Collection Time: 10/24/24  1:41 PM   Result Value Ref Range    WBC 6.9 3.6 - 11.0 K/uL    RBC 2.21 (L) 3.80 - 5.20 M/uL    Hemoglobin 6.5 (L) 11.5 - 16.0 g/dL    Hematocrit 20.0 (L) 35.0 - 47.0 %    MCV 90.5 80.0 - 99.0 FL    MCH 29.4 26.0 - 34.0 PG    MCHC 32.5 30.0 - 36.5 g/dL    RDW 14.0 11.5 - 14.5 %    Platelets 207 150 - 400 K/uL    MPV 9.8 8.9 - 12.9 FL    Nucleated RBCs 0.0 0.0  WBC    nRBC 0.00 0.00 - 0.01 K/uL   Basic Metabolic Panel    Collection Time: 10/24/24  1:41 PM   Result Value Ref Range    Sodium 136 136 - 145 mmol/L    Potassium 5.6 (H) 3.5 - 5.1 mmol/L    Chloride 103 97 - 108 mmol/L    CO2 26 21 - 32 mmol/L    Anion Gap 7 2 - 12 mmol/L    Glucose 99 65 - 100 mg/dL     (H) 6 - 20 mg/dL    Creatinine 13.30 (H) 0.55 - 1.02 mg/dL    BUN/Creatinine Ratio 8 (L) 12 - 20      Est, Glom Filt Rate 3 (L) >60 ml/min/1.73m2    Calcium 7.0 (L) 8.5 - 10.1 mg/dL   TYPE AND SCREEN    Collection Time: 10/24/24  1:50 PM   Result Value Ref Range    Crossmatch expiration date 10/27/2024,2359     ABO/Rh O Positive     Antibody Screen Negative        Radiologic Studies  No orders to display     ------------------------------------------------------------------------------------------------------------  The evaluation and treatment you received in the Emergency Department were for an urgent problem. It is important that you

## 2024-10-26 LAB
ABO + RH BLD: NORMAL
BLD PROD TYP BPU: NORMAL
BLOOD BANK BLOOD PRODUCT EXPIRATION DATE: NORMAL
BLOOD BANK DISPENSE STATUS: NORMAL
BLOOD BANK ISBT PRODUCT BLOOD TYPE: 5100
BLOOD BANK UNIT TYPE AND RH: NORMAL
BLOOD GROUP ANTIBODIES SERPL: NEGATIVE
BPU ID: NORMAL
CROSSMATCH RESULT: NORMAL
SPECIMEN EXP DATE BLD: NORMAL
TRANSFUSION STATUS PATIENT QL: NORMAL
UNIT DIVISION: 0
UNIT ISSUE DATE/TIME: NORMAL

## 2025-03-24 ENCOUNTER — TELEPHONE (OUTPATIENT)
Age: 60
End: 2025-03-24

## 2025-03-24 NOTE — TELEPHONE ENCOUNTER
Talat from [facility] called to schedule patient as a new patient with Dr. Talbert. I scheduled patients appointment and also verified patients insurance. Patient still has a [employer] plan and I advised Talat that we are still in negotiation with [employer] and will not know until after the 1st of April. Talat stated she would follow back up to see if she will be keeping the patients appointment.

## 2025-04-15 ENCOUNTER — TELEPHONE (OUTPATIENT)
Age: 60
End: 2025-04-15

## 2025-04-18 ENCOUNTER — TELEPHONE (OUTPATIENT)
Age: 60
End: 2025-04-18

## 2025-04-18 NOTE — TELEPHONE ENCOUNTER
Attempted to contact patient in regards to upcoming appointment and to verify insurance. No answer, unable to leave a voicemail due to to mailbox being full.

## 2025-05-14 ENCOUNTER — CLINICAL DOCUMENTATION (OUTPATIENT)
Facility: HOSPITAL | Age: 60
End: 2025-05-14

## 2025-05-14 ENCOUNTER — HOSPITAL ENCOUNTER (OUTPATIENT)
Facility: HOSPITAL | Age: 60
Setting detail: INFUSION SERIES
Discharge: HOME | End: 2025-05-14

## 2025-05-14 VITALS
TEMPERATURE: 98.2 F | BODY MASS INDEX: 20.83 KG/M2 | HEART RATE: 81 BPM | SYSTOLIC BLOOD PRESSURE: 160 MMHG | DIASTOLIC BLOOD PRESSURE: 92 MMHG | HEIGHT: 65 IN | OXYGEN SATURATION: 100 % | WEIGHT: 125 LBS | RESPIRATION RATE: 16 BRPM

## 2025-05-14 LAB
HCT VFR BLD AUTO: 21.1 % (ref 35–47)
HGB BLD-MCNC: 6.4 G/DL (ref 11.5–16)

## 2025-05-14 PROCEDURE — P9016 RBC LEUKOCYTES REDUCED: HCPCS

## 2025-05-14 PROCEDURE — 6370000000 HC RX 637 (ALT 250 FOR IP): Performed by: INTERNAL MEDICINE

## 2025-05-14 PROCEDURE — 36415 COLL VENOUS BLD VENIPUNCTURE: CPT

## 2025-05-14 PROCEDURE — 86923 COMPATIBILITY TEST ELECTRIC: CPT

## 2025-05-14 PROCEDURE — 86901 BLOOD TYPING SEROLOGIC RH(D): CPT

## 2025-05-14 PROCEDURE — 86850 RBC ANTIBODY SCREEN: CPT

## 2025-05-14 PROCEDURE — 86900 BLOOD TYPING SEROLOGIC ABO: CPT

## 2025-05-14 PROCEDURE — 85018 HEMOGLOBIN: CPT

## 2025-05-14 PROCEDURE — 36430 TRANSFUSION BLD/BLD COMPNT: CPT

## 2025-05-14 PROCEDURE — 85014 HEMATOCRIT: CPT

## 2025-05-14 RX ORDER — ACETAMINOPHEN 325 MG/1
650 TABLET ORAL ONCE
Status: COMPLETED | OUTPATIENT
Start: 2025-05-14 | End: 2025-05-14

## 2025-05-14 RX ORDER — SODIUM CHLORIDE 9 MG/ML
INJECTION, SOLUTION INTRAVENOUS PRN
Status: DISCONTINUED | OUTPATIENT
Start: 2025-05-14 | End: 2025-05-15 | Stop reason: HOSPADM

## 2025-05-14 RX ORDER — DIPHENHYDRAMINE HCL 25 MG
25 CAPSULE ORAL ONCE
Status: COMPLETED | OUTPATIENT
Start: 2025-05-14 | End: 2025-05-14

## 2025-05-14 RX ADMIN — DIPHENHYDRAMINE HYDROCHLORIDE 25 MG: 25 CAPSULE ORAL at 13:42

## 2025-05-14 RX ADMIN — ACETAMINOPHEN 650 MG: 325 TABLET ORAL at 13:42

## 2025-05-14 ASSESSMENT — PAIN SCALES - GENERAL
PAINLEVEL_OUTOF10: 0

## 2025-05-14 NOTE — PROGRESS NOTES
Patient presents to dialysis this morning.  She had a hemoglobin of 16.5 documented last week and on Monday of this week, 2 days prior to this evaluation her hemoglobin dropped to 6.5.  She had no complaints.  Completed dialysis this morning was sent to the hospital.  Repeat hemoglobin was 6.4.  She is receiving 1 unit of packed cells.  She denies shortness of breath, chest or abdominal pain.  No blood by vaginal or rectal route.  No hemoptysis hematemesis.  Neurological rate is fatigued.    Labs from dialysis in addition to hemoglobin 6.5 showed slight decrease in iron saturation.  She had not been started on an LENCHO yet.  She is new to our hemodialysis unit.  She was given 100 mg of Venofer in dialysis and started on Mircera 100 mg/week..  Will check hemoglobin at next dialysis treatment.  Adjust Mircera.  If hemoglobin drops further she will need a GI evaluation.  This was discussed with the patient.    Physical examination:  Seen in same-day, receiving 1 unit packed cells.  Vital signs are stable, afebrile  Lungs clear.  Cardiac exam was unremarkable.  Abdomen was soft without distention or pain.  Extremities showed no edema.  Examination was nonfocal.  She was alert and oriented x 3.  She was conversant.    I will follow-up with her next week during hemodialysis rounds.

## 2025-05-14 NOTE — PROGRESS NOTES
Patient received 1 unit of PRBCs without any complications/issues. Patient refused to stay 1 hour post transfusion.     Discharge instructions/education provided to patient, patient verbalized understanding and had no questions.     IV removed and patient ambulated self out of SDS.

## 2025-05-14 NOTE — PROGRESS NOTES
Arrived  to   sameday   from   dialysis today   here  for  blood  transfusion  today alert  oriented h and  h  drawn  if  continues  to be  low  will plan  for 1  unit  blood   labs  sent  at  1145 for  h and h and  type  and  cross sent  at 1150 call bell within reach  and   waiit  for  lab  work  blood  consent  signed   and  placed  on  chart   1340 DR Hollingsworth   wants  to   give  one unit  of  blood  and  discahrge  home will have  pt   follow up  with  GI  for   workup   1355 blood  started  and  nurse  remained  at  bedside  first 15min  no reaction rate  increased to  100ml hr on   iv  pump  robb well  call bell within reach  pt  had  lunch

## 2025-05-15 LAB
ABO + RH BLD: NORMAL
BLD PROD TYP BPU: NORMAL
BLOOD BANK BLOOD PRODUCT EXPIRATION DATE: NORMAL
BLOOD BANK DISPENSE STATUS: NORMAL
BLOOD BANK ISBT PRODUCT BLOOD TYPE: 5100
BLOOD BANK PRODUCT CODE: NORMAL
BLOOD BANK UNIT TYPE AND RH: NORMAL
BLOOD GROUP ANTIBODIES SERPL: NEGATIVE
BPU ID: NORMAL
CROSSMATCH RESULT: NORMAL
SPECIMEN EXP DATE BLD: NORMAL
TRANSFUSION STATUS PATIENT QL: NORMAL
UNIT DIVISION: 0
UNIT ISSUE DATE/TIME: NORMAL

## 2025-07-01 ENCOUNTER — OFFICE VISIT (OUTPATIENT)
Age: 60
End: 2025-07-01
Payer: MEDICAID

## 2025-07-01 VITALS
HEIGHT: 65 IN | DIASTOLIC BLOOD PRESSURE: 82 MMHG | HEART RATE: 82 BPM | TEMPERATURE: 98 F | BODY MASS INDEX: 20.79 KG/M2 | WEIGHT: 124.8 LBS | OXYGEN SATURATION: 95 % | RESPIRATION RATE: 16 BRPM | SYSTOLIC BLOOD PRESSURE: 116 MMHG

## 2025-07-01 DIAGNOSIS — N25.81 SECONDARY HYPERPARATHYROIDISM OF RENAL ORIGIN: Primary | ICD-10-CM

## 2025-07-01 PROCEDURE — 3074F SYST BP LT 130 MM HG: CPT | Performed by: SURGERY

## 2025-07-01 PROCEDURE — 3079F DIAST BP 80-89 MM HG: CPT | Performed by: SURGERY

## 2025-07-01 PROCEDURE — 99205 OFFICE O/P NEW HI 60 MIN: CPT | Performed by: SURGERY

## 2025-07-01 ASSESSMENT — PATIENT HEALTH QUESTIONNAIRE - PHQ9
SUM OF ALL RESPONSES TO PHQ QUESTIONS 1-9: 0
2. FEELING DOWN, DEPRESSED OR HOPELESS: NOT AT ALL
1. LITTLE INTEREST OR PLEASURE IN DOING THINGS: NOT AT ALL

## 2025-07-02 NOTE — PROGRESS NOTES
Identified pt with two pt identifiers (name and ). Reviewed chart in preparation for visit and have obtained necessary documentation.    Brigitte Castro is a 59 y.o. female  Chief Complaint   Patient presents with    New Patient     /82 (BP Site: Left Upper Arm, Patient Position: Sitting, BP Cuff Size: Medium Adult)   Pulse 82   Temp 98 °F (36.7 °C) (Oral)   Resp 16   Ht 1.651 m (5' 5\")   Wt 56.6 kg (124 lb 12.8 oz)   SpO2 95%   BMI 20.77 kg/m²     1. Have you been to the ER, urgent care clinic since your last visit?  Hospitalized since your last visit?yes - [facility] constipation     2. Have you seen or consulted any other health care providers outside of the [facility] since your last visit?  Include any pap smears or colon screening. no  
distress  HEENT - NC/AT.  No scleral icterus.  No palpable nodularity appreciated.  Pulm - CTAB, normal inspiratory effort  CV - RRR, no M/R/G  Abd - soft, NT, ND.  No palpable masses or organomegaly.  Ext - warm, well perfused, no edema  Lymphatics - no cervical, supraclavicular or inguinal adenopathy noted.   Skin - supple, no rashes  Psychiatric - normal affect, good mood    Labs  Outside labs not available for me to review.    Imaging  None      Assessment:     Brigitte Castro is a 59 y.o. female with history of end-stage renal disease on dialysis referred for secondary hyperparathyroidism.    Recommendations:     1.   I would like to get the patient's outside records to confirm her diagnosis.  Once that is obtained, I would plan to get an ultrasound of her neck and then would proceed with a total parathyroidectomy and auto implantation of a partial gland for secondary hyperparathyroidism.  We discussed the operation, potential complications including severe hypocalcemia requiring inpatient hospital stay postoperatively, bleeding, injury to the recurrent laryngeal nerve or other unforeseen complications.  She is in agreement to proceed.      60 mins of time was spent with the patient of which > 50% of the time involved face-to-face counseling of the patient regarding the proposed treatment plan.      Moiz Talbert MD  7/1/2025    CC: Rosalie Alejandro MD Dr. Richard Gayle

## 2025-07-31 ENCOUNTER — CLINICAL DOCUMENTATION (OUTPATIENT)
Age: 60
End: 2025-07-31

## 2025-08-11 ENCOUNTER — TELEPHONE (OUTPATIENT)
Age: 60
End: 2025-08-11

## 2025-08-19 ENCOUNTER — HOSPITAL ENCOUNTER (OUTPATIENT)
Facility: HOSPITAL | Age: 60
Discharge: HOME | End: 2025-08-22
Attending: SURGERY
Payer: MEDICAID

## 2025-08-19 DIAGNOSIS — N25.81 SECONDARY HYPERPARATHYROIDISM OF RENAL ORIGIN: ICD-10-CM

## 2025-08-19 PROCEDURE — 76536 US EXAM OF HEAD AND NECK: CPT

## 2025-08-28 ENCOUNTER — TELEPHONE (OUTPATIENT)
Age: 60
End: 2025-08-28

## 2025-08-28 PROBLEM — N25.81 SECONDARY HYPERPARATHYROIDISM: Status: ACTIVE | Noted: 2025-08-28

## 2025-08-28 PROBLEM — N18.6 END STAGE RENAL DISEASE (HCC): Status: ACTIVE | Noted: 2025-08-28

## 2025-08-29 DIAGNOSIS — N25.81 SECONDARY HYPERPARATHYROIDISM: ICD-10-CM

## 2025-09-05 ENCOUNTER — TRANSCRIBE ORDERS (OUTPATIENT)
Facility: HOSPITAL | Age: 60
End: 2025-09-05

## 2025-09-05 DIAGNOSIS — R18.8 OTHER ASCITES: Primary | ICD-10-CM

## 2025-09-05 DIAGNOSIS — N18.6 END STAGE RENAL DISEASE (HCC): ICD-10-CM
